# Patient Record
Sex: FEMALE | Race: BLACK OR AFRICAN AMERICAN | NOT HISPANIC OR LATINO | Employment: FULL TIME | URBAN - METROPOLITAN AREA
[De-identification: names, ages, dates, MRNs, and addresses within clinical notes are randomized per-mention and may not be internally consistent; named-entity substitution may affect disease eponyms.]

---

## 2017-02-07 ENCOUNTER — OFFICE VISIT - HEALTHEAST (OUTPATIENT)
Dept: FAMILY MEDICINE | Facility: CLINIC | Age: 27
End: 2017-02-07

## 2017-02-07 DIAGNOSIS — K21.9 GERD (GASTROESOPHAGEAL REFLUX DISEASE): ICD-10-CM

## 2017-02-07 DIAGNOSIS — K59.00 CONSTIPATION: ICD-10-CM

## 2017-02-07 DIAGNOSIS — R53.83 FATIGUE: ICD-10-CM

## 2017-02-07 DIAGNOSIS — Z30.46 NEXPLANON REMOVAL: ICD-10-CM

## 2017-02-07 ASSESSMENT — MIFFLIN-ST. JEOR: SCORE: 1262.66

## 2017-02-10 ENCOUNTER — COMMUNICATION - HEALTHEAST (OUTPATIENT)
Dept: FAMILY MEDICINE | Facility: CLINIC | Age: 27
End: 2017-02-10

## 2017-02-10 DIAGNOSIS — A04.8 HELICOBACTER PYLORI (H. PYLORI): ICD-10-CM

## 2017-03-21 ENCOUNTER — OFFICE VISIT - HEALTHEAST (OUTPATIENT)
Dept: FAMILY MEDICINE | Facility: CLINIC | Age: 27
End: 2017-03-21

## 2017-03-21 ENCOUNTER — RECORDS - HEALTHEAST (OUTPATIENT)
Dept: GENERAL RADIOLOGY | Facility: CLINIC | Age: 27
End: 2017-03-21

## 2017-03-21 ENCOUNTER — COMMUNICATION - HEALTHEAST (OUTPATIENT)
Dept: FAMILY MEDICINE | Facility: CLINIC | Age: 27
End: 2017-03-21

## 2017-03-21 DIAGNOSIS — R10.31 RLQ ABDOMINAL PAIN: ICD-10-CM

## 2017-03-21 DIAGNOSIS — R10.31 RIGHT LOWER QUADRANT PAIN: ICD-10-CM

## 2017-03-21 DIAGNOSIS — K59.00 CONSTIPATION: ICD-10-CM

## 2017-06-26 ENCOUNTER — OFFICE VISIT - HEALTHEAST (OUTPATIENT)
Dept: FAMILY MEDICINE | Facility: CLINIC | Age: 27
End: 2017-06-26

## 2017-06-26 DIAGNOSIS — N92.6 IRREGULAR PERIODS: ICD-10-CM

## 2017-06-26 ASSESSMENT — MIFFLIN-ST. JEOR: SCORE: 1268.89

## 2017-07-31 ENCOUNTER — COMMUNICATION - HEALTHEAST (OUTPATIENT)
Dept: FAMILY MEDICINE | Facility: CLINIC | Age: 27
End: 2017-07-31

## 2017-08-10 ENCOUNTER — OFFICE VISIT - HEALTHEAST (OUTPATIENT)
Dept: FAMILY MEDICINE | Facility: CLINIC | Age: 27
End: 2017-08-10

## 2017-08-10 DIAGNOSIS — Z34.90 PREGNANCY: ICD-10-CM

## 2017-08-10 DIAGNOSIS — N91.2 AMENORRHEA: ICD-10-CM

## 2017-08-10 ASSESSMENT — MIFFLIN-ST. JEOR: SCORE: 1262.67

## 2017-08-17 ENCOUNTER — RECORDS - HEALTHEAST (OUTPATIENT)
Dept: ADMINISTRATIVE | Facility: OTHER | Age: 27
End: 2017-08-17

## 2017-09-21 ENCOUNTER — COMMUNICATION - HEALTHEAST (OUTPATIENT)
Dept: FAMILY MEDICINE | Facility: CLINIC | Age: 27
End: 2017-09-21

## 2017-09-29 ENCOUNTER — PRENATAL OFFICE VISIT - HEALTHEAST (OUTPATIENT)
Dept: FAMILY MEDICINE | Facility: CLINIC | Age: 27
End: 2017-09-29

## 2017-09-29 DIAGNOSIS — Z34.90 PREGNANCY: ICD-10-CM

## 2017-09-29 LAB — HIV 1+2 AB+HIV1 P24 AG SERPL QL IA: NEGATIVE

## 2017-09-29 ASSESSMENT — MIFFLIN-ST. JEOR: SCORE: 1259.27

## 2017-09-30 LAB — HBV SURFACE AG SERPL QL IA: NEGATIVE

## 2017-10-02 LAB — SYPHILIS RPR SCREEN - HISTORICAL: NORMAL

## 2017-10-31 ENCOUNTER — OFFICE VISIT - HEALTHEAST (OUTPATIENT)
Dept: FAMILY MEDICINE | Facility: CLINIC | Age: 27
End: 2017-10-31

## 2017-10-31 DIAGNOSIS — J02.9 SORE THROAT: ICD-10-CM

## 2017-10-31 DIAGNOSIS — J06.9 URI (UPPER RESPIRATORY INFECTION): ICD-10-CM

## 2017-11-02 ENCOUNTER — PRENATAL OFFICE VISIT - HEALTHEAST (OUTPATIENT)
Dept: FAMILY MEDICINE | Facility: CLINIC | Age: 27
End: 2017-11-02

## 2017-11-02 DIAGNOSIS — Z34.90 PREGNANCY: ICD-10-CM

## 2017-11-02 DIAGNOSIS — R05.9 COUGH: ICD-10-CM

## 2017-11-02 ASSESSMENT — MIFFLIN-ST. JEOR: SCORE: 1277.41

## 2017-11-22 ENCOUNTER — RECORDS - HEALTHEAST (OUTPATIENT)
Dept: ADMINISTRATIVE | Facility: OTHER | Age: 27
End: 2017-11-22

## 2017-12-28 ENCOUNTER — PRENATAL OFFICE VISIT - HEALTHEAST (OUTPATIENT)
Dept: FAMILY MEDICINE | Facility: CLINIC | Age: 27
End: 2017-12-28

## 2017-12-28 DIAGNOSIS — K59.00 CONSTIPATION: ICD-10-CM

## 2017-12-28 DIAGNOSIS — Z34.90 PREGNANCY: ICD-10-CM

## 2017-12-28 ASSESSMENT — MIFFLIN-ST. JEOR: SCORE: 1304.63

## 2018-03-30 ENCOUNTER — HOSPITAL ENCOUNTER (OUTPATIENT)
Dept: ULTRASOUND IMAGING | Facility: CLINIC | Age: 28
Discharge: HOME | End: 2018-03-30
Attending: REGISTERED NURSE | Admitting: REGISTERED NURSE
Payer: COMMERCIAL

## 2018-03-30 DIAGNOSIS — O26.849 SIGNIFICANT DISCREPANCY BETWEEN UTERINE SIZE AND CLINICAL DATES, ANTEPARTUM: ICD-10-CM

## 2018-03-30 PROCEDURE — 76815 OB US LIMITED FETUS(S): CPT

## 2018-04-09 ENCOUNTER — HOSPITAL ENCOUNTER (OUTPATIENT)
Facility: CLINIC | Age: 28
Discharge: HOME | End: 2018-04-09
Attending: OBSTETRICS & GYNECOLOGY | Admitting: OBSTETRICS & GYNECOLOGY
Payer: COMMERCIAL

## 2018-04-09 VITALS — TEMPERATURE: 98 F | SYSTOLIC BLOOD PRESSURE: 119 MMHG | DIASTOLIC BLOOD PRESSURE: 76 MMHG | RESPIRATION RATE: 16 BRPM

## 2018-04-09 PROBLEM — Z36.89 ENCOUNTER FOR TRIAGE IN PREGNANT PATIENT: Status: ACTIVE | Noted: 2018-04-09

## 2018-04-09 PROCEDURE — 59025 FETAL NON-STRESS TEST: CPT

## 2018-04-09 PROCEDURE — G0463 HOSPITAL OUTPT CLINIC VISIT: HCPCS | Mod: 25

## 2018-04-09 NOTE — IP AVS SNAPSHOT
MRN:[id number]                      After Visit Summary   4/9/2018    Rody Hassan    MRN: [id number]           Thank you!     Thank you for choosing [facility] for your care. Our goal is always to provide you with excellent care. Hearing back from our patients is one way we can continue to improve our services. Please take a few minutes to complete the written survey that you may receive in the mail after you visit. If you would like to speak to someone directly about your visit please contact Patient Relations at [phone]. Thank you!          Patient Information     Date Of Birth          1990        About your hospital stay     You were admitted on:  April 9, 2018 You last received care in the:  [facility]    You were discharged on:  April 9, 2018       Who to Call     For medical emergencies, please call 911.  For non-urgent questions about your medical care, please call your primary care provider or clinic, None          Attending Provider     Provider Specialty    Luci Penaloza MD OB/Gyn       Primary Care Provider Fax #    [facility] [phone]      Further instructions from your care team       Discharge Instruction for Undelivered Patients      You were seen for: Fetal Assessment  We Consulted: Dr Penaloza  You had (Test or Medicine):NST     Diet:   Drink 8 to 12 glasses of liquids (milk, juice, water) every day.     Activity:  Call your doctor or nurse midwife if your baby is moving less than usual.     Call your provider if you notice:  Swelling in your face or increased swelling in your hands or legs.  Headaches that are not relieved by Tylenol (acetaminophen).  Changes in your vision (blurring: seeing spots or stars.)  Nausea (sick to your stomach) and vomiting (throwing up).   Weight gain of 5 pounds or more per week.  Heartburn that doesn't go away.  Signs of bladder infection: pain when you urinate  "(use the toilet), need to go more often and more urgently.  The bag of drummond (rupture of membranes) breaks, or you notice leaking in your underwear.  Bright red blood in your underwear.  Abdominal (lower belly) or stomach pain.  For first baby: Contractions (tightening) less than 5 minutes apart for one hour or more.  Second (plus) baby: Contractions (tightening) less than 10 minutes apart and getting stronger.  *If less than 34 weeks: Contractions (tightenings) more than 6 times in one hour.  Increase or change in vaginal discharge (note the color and amount)      Follow-up:  As scheduled in the clinic          Pending Results     No orders found from 2018 to 4/10/2018.            Admission Information     Date & Time Provider Department Dept. Phone    2018 Luci Penaloza MD [facility] Labor and Delivery [phone]      Your Vitals Were     Blood Pressure Temperature Respirations             119/76 98  F (36.7  C) (Oral) 16         MyChart Information     [employer] lets you send messages to your doctor, view your test results, renew your prescriptions, schedule appointments and more. To sign up, go to [email or web address] . Click on \"Log in\" on the left side of the screen, which will take you to the Welcome page. Then click on \"Sign up Now\" on the right side of the page.     You will be asked to enter the access code listed below, as well as some personal information. Please follow the directions to create your username and password.     Your access code is: [email or web address]  Expires: 2018 10:48 PM     Your access code will  in 90 days. If you need help or a new code, please call your [place] clinic or [phone].        Care EveryWhere ID     This is your Care EveryWhere ID. This could be used by other organizations to access your [place] medical records  [id number]        Equal Access to Services     GRECIA ESCOBEDO AH: aliyah Garcia, " carlos enrique laurajoseeitan beystarr, waxallegra flynnmarva cobianaan ah. [facility] [phone].    ATENCIÓN: Si tracie cisneros, tiene a benedict disposición servicios gratuitos de asistencia lingüística. Llame al [phone].    We comply with applicable federal civil rights laws and [place] laws. We do not discriminate on the basis of race, color, national origin, age, disability, sex, sexual orientation, or gender identity.               Review of your medicines      UNREVIEWED medicines. Ask your doctor about these medicines        Dose / Directions    PREVPAC Misc   Used for:  H. pylori infection   Generic drug:  abomgyqpj-[id number]-[id number]        take it as directed.   Refills:  0       ZANTAC PO        Refills:  0         CONTINUE these medicines which have NOT CHANGED        Dose / Directions    NO ACTIVE MEDICATIONS   Used for:  Esophageal reflux        .   Refills:  0                Protect others around you: Learn how to safely use, store and throw away your medicines at www.disposemymeds.org.             Medication List: This is a list of all your medications and when to take them. Check marks below indicate your daily home schedule. Keep this list as a reference.      Medications           Morning Afternoon Evening Bedtime As Needed    NO ACTIVE MEDICATIONS   .                                PREVPAC Misc   take it as directed.   Generic drug:  [id number]-[id number]-fgdteffxv                                ZANTAC PO

## 2018-04-09 NOTE — IP AVS SNAPSHOT
[facility] Labor and Delivery    [address]    [facility] [address]    Phone:  [phone]    Fax:  [phone]                                       After Visit Summary   4/9/2018    Rody Hassan    MRN: [id number]           After Visit Summary Signature Page     I have received my discharge instructions, and my questions have been answered. I have discussed any challenges I see with this plan with the nurse or doctor.    ..........................................................................................................................................  Patient/Patient Representative Signature      ..........................................................................................................................................  Patient Representative Print Name and Relationship to Patient    ..................................................               ................................................  Date                                            Time    ..........................................................................................................................................  Reviewed by Signature/Title    ...................................................              ..............................................  Date                                                            Time

## 2018-04-10 NOTE — DISCHARGE INSTRUCTIONS
Discharge Instruction for Undelivered Patients      You were seen for: Fetal Assessment  We Consulted: Dr Penaloza  You had (Test or Medicine):NST     Diet:   Drink 8 to 12 glasses of liquids (milk, juice, water) every day.     Activity:  Call your doctor or nurse midwife if your baby is moving less than usual.     Call your provider if you notice:  Swelling in your face or increased swelling in your hands or legs.  Headaches that are not relieved by Tylenol (acetaminophen).  Changes in your vision (blurring: seeing spots or stars.)  Nausea (sick to your stomach) and vomiting (throwing up).   Weight gain of 5 pounds or more per week.  Heartburn that doesn't go away.  Signs of bladder infection: pain when you urinate (use the toilet), need to go more often and more urgently.  The bag of drummond (rupture of membranes) breaks, or you notice leaking in your underwear.  Bright red blood in your underwear.  Abdominal (lower belly) or stomach pain.  For first baby: Contractions (tightening) less than 5 minutes apart for one hour or more.  Second (plus) baby: Contractions (tightening) less than 10 minutes apart and getting stronger.  *If less than 34 weeks: Contractions (tightenings) more than 6 times in one hour.  Increase or change in vaginal discharge (note the color and amount)      Follow-up:  As scheduled in the clinic

## 2018-04-10 NOTE — PLAN OF CARE
Data: Patient presented to [facility]: 2018 10:11 PM.  Reason for maternal/fetal assessment is decreased fetal movement. Patient reports that since yesterday morning she has felt minimal movement from baby.  Patient is a .  Prenatal record reviewed. Pregnancy has been uncomplicated..  Gestational Age 39w5d. VSS. Fetal movement present. Patient denies uterine contractions, leaking of vaginal fluid/rupture of membranes, vaginal bleeding, pelvic pressure, nausea, vomiting, headache, visual disturbances, epigastric or URQ pain, significant edema. Support person is present.   Action: Verbal consent for EFM. Triage assessment completed. Bill of rights reviewed.  Response: Patient verbalized agreement with plan. Will contact Dr Luci Penaloza with update and further orders.

## 2018-04-10 NOTE — PLAN OF CARE
Data: Patient assessed in the Birthplace for decreased fetal movement.  Cervical exam not examined.  Membranes intact.  Contractions palpate mild and pt denies feeling any.  Action:  Presumed adequate fetal oxygenation documented (see flow record). Discharge instructions reviewed.  Patient instructed to report change in fetal movement, vaginal leaking of fluid or bleeding, abdominal pain, or any concerns related to the pregnancy to her nurse/physician.    Response: Orders to discharge home per Luci Penaloza.  Patient verbalized understanding of education and verbalized agreement with plan. Discharged to home at 2253.

## 2018-04-10 NOTE — PROVIDER NOTIFICATION
04/09/18 2245   Provider Notification   Provider Name/Title Dr Penaloza   Method of Notification Phone   Request Evaluate - Remote   Notification Reason Patient Arrived;Status Update     MD updated on pt arrival and reactive NST. Updated that pt is having mild contractions that she is not feeling. Orders to discharge home

## 2018-04-14 ENCOUNTER — HOSPITAL ENCOUNTER (INPATIENT)
Facility: CLINIC | Age: 28
LOS: 1 days | Discharge: HOME | End: 2018-04-15
Attending: OBSTETRICS & GYNECOLOGY | Admitting: OBSTETRICS & GYNECOLOGY
Payer: COMMERCIAL

## 2018-04-14 LAB
ABO + RH BLD: NORMAL
ABO + RH BLD: NORMAL
SPECIMEN EXP DATE BLD: NORMAL

## 2018-04-14 PROCEDURE — 25000125 ZZHC RX 250: Performed by: OBSTETRICS & GYNECOLOGY

## 2018-04-14 PROCEDURE — 25000132 ZZH RX MED GY IP 250 OP 250 PS 637: Performed by: OBSTETRICS & GYNECOLOGY

## 2018-04-14 PROCEDURE — 25000128 H RX IP 250 OP 636: Performed by: OBSTETRICS & GYNECOLOGY

## 2018-04-14 PROCEDURE — 86780 TREPONEMA PALLIDUM: CPT | Performed by: OBSTETRICS & GYNECOLOGY

## 2018-04-14 PROCEDURE — 0KQM0ZZ REPAIR PERINEUM MUSCLE, OPEN APPROACH: ICD-10-PCS | Performed by: OBSTETRICS & GYNECOLOGY

## 2018-04-14 PROCEDURE — 86900 BLOOD TYPING SEROLOGIC ABO: CPT | Performed by: OBSTETRICS & GYNECOLOGY

## 2018-04-14 PROCEDURE — 72200001 ZZH LABOR CARE VAGINAL DELIVERY SINGLE

## 2018-04-14 PROCEDURE — 86901 BLOOD TYPING SEROLOGIC RH(D): CPT | Performed by: OBSTETRICS & GYNECOLOGY

## 2018-04-14 PROCEDURE — 10907ZC DRAINAGE OF AMNIOTIC FLUID, THERAPEUTIC FROM PRODUCTS OF CONCEPTION, VIA NATURAL OR ARTIFICIAL OPENING: ICD-10-PCS | Performed by: OBSTETRICS & GYNECOLOGY

## 2018-04-14 PROCEDURE — 12000029 ZZH R&B OB INTERMEDIATE

## 2018-04-14 RX ORDER — SODIUM CHLORIDE, SODIUM LACTATE, POTASSIUM CHLORIDE, CALCIUM CHLORIDE 600; 310; 30; 20 MG/100ML; MG/100ML; MG/100ML; MG/100ML
INJECTION, SOLUTION INTRAVENOUS CONTINUOUS
Status: DISCONTINUED | OUTPATIENT
Start: 2018-04-14 | End: 2018-04-14

## 2018-04-14 RX ORDER — ACETAMINOPHEN 325 MG/1
650 TABLET ORAL EVERY 4 HOURS PRN
Status: DISCONTINUED | OUTPATIENT
Start: 2018-04-14 | End: 2018-04-14

## 2018-04-14 RX ORDER — AMOXICILLIN 250 MG
2 CAPSULE ORAL 2 TIMES DAILY
Status: DISCONTINUED | OUTPATIENT
Start: 2018-04-14 | End: 2018-04-15 | Stop reason: HOSPADM

## 2018-04-14 RX ORDER — OXYTOCIN/0.9 % SODIUM CHLORIDE 30/500 ML
100 PLASTIC BAG, INJECTION (ML) INTRAVENOUS CONTINUOUS
Status: DISCONTINUED | OUTPATIENT
Start: 2018-04-14 | End: 2018-04-15 | Stop reason: HOSPADM

## 2018-04-14 RX ORDER — OXYTOCIN 10 [USP'U]/ML
10 INJECTION, SOLUTION INTRAMUSCULAR; INTRAVENOUS
Status: DISCONTINUED | OUTPATIENT
Start: 2018-04-14 | End: 2018-04-15 | Stop reason: HOSPADM

## 2018-04-14 RX ORDER — AMOXICILLIN 250 MG
1 CAPSULE ORAL 2 TIMES DAILY
Status: DISCONTINUED | OUTPATIENT
Start: 2018-04-14 | End: 2018-04-15 | Stop reason: HOSPADM

## 2018-04-14 RX ORDER — MISOPROSTOL 200 UG/1
400 TABLET ORAL
Status: DISCONTINUED | OUTPATIENT
Start: 2018-04-14 | End: 2018-04-15 | Stop reason: HOSPADM

## 2018-04-14 RX ORDER — ACETAMINOPHEN 325 MG/1
650 TABLET ORAL EVERY 4 HOURS PRN
Status: DISCONTINUED | OUTPATIENT
Start: 2018-04-14 | End: 2018-04-15 | Stop reason: HOSPADM

## 2018-04-14 RX ORDER — HYDROCORTISONE 2.5 %
CREAM (GRAM) TOPICAL 3 TIMES DAILY PRN
Status: DISCONTINUED | OUTPATIENT
Start: 2018-04-14 | End: 2018-04-15 | Stop reason: HOSPADM

## 2018-04-14 RX ORDER — CARBOPROST TROMETHAMINE 250 UG/ML
250 INJECTION, SOLUTION INTRAMUSCULAR
Status: DISCONTINUED | OUTPATIENT
Start: 2018-04-14 | End: 2018-04-14

## 2018-04-14 RX ORDER — BISACODYL 10 MG
10 SUPPOSITORY, RECTAL RECTAL DAILY PRN
Status: DISCONTINUED | OUTPATIENT
Start: 2018-04-16 | End: 2018-04-15 | Stop reason: HOSPADM

## 2018-04-14 RX ORDER — METHYLERGONOVINE MALEATE 0.2 MG/ML
200 INJECTION INTRAVENOUS
Status: DISCONTINUED | OUTPATIENT
Start: 2018-04-14 | End: 2018-04-14

## 2018-04-14 RX ORDER — ONDANSETRON 2 MG/ML
4 INJECTION INTRAMUSCULAR; INTRAVENOUS EVERY 6 HOURS PRN
Status: DISCONTINUED | OUTPATIENT
Start: 2018-04-14 | End: 2018-04-14

## 2018-04-14 RX ORDER — FENTANYL CITRATE 50 UG/ML
50-100 INJECTION, SOLUTION INTRAMUSCULAR; INTRAVENOUS
Status: DISCONTINUED | OUTPATIENT
Start: 2018-04-14 | End: 2018-04-14

## 2018-04-14 RX ORDER — OXYTOCIN/0.9 % SODIUM CHLORIDE 30/500 ML
100-340 PLASTIC BAG, INJECTION (ML) INTRAVENOUS CONTINUOUS PRN
Status: COMPLETED | OUTPATIENT
Start: 2018-04-14 | End: 2018-04-14

## 2018-04-14 RX ORDER — IBUPROFEN 800 MG/1
800 TABLET, FILM COATED ORAL EVERY 6 HOURS PRN
Status: DISCONTINUED | OUTPATIENT
Start: 2018-04-14 | End: 2018-04-15 | Stop reason: HOSPADM

## 2018-04-14 RX ORDER — OXYTOCIN/0.9 % SODIUM CHLORIDE 30/500 ML
340 PLASTIC BAG, INJECTION (ML) INTRAVENOUS CONTINUOUS PRN
Status: DISCONTINUED | OUTPATIENT
Start: 2018-04-14 | End: 2018-04-15 | Stop reason: HOSPADM

## 2018-04-14 RX ORDER — LANOLIN 100 %
OINTMENT (GRAM) TOPICAL
Status: DISCONTINUED | OUTPATIENT
Start: 2018-04-14 | End: 2018-04-15 | Stop reason: HOSPADM

## 2018-04-14 RX ORDER — OXYCODONE AND ACETAMINOPHEN 5; 325 MG/1; MG/1
1 TABLET ORAL
Status: COMPLETED | OUTPATIENT
Start: 2018-04-14 | End: 2018-04-14

## 2018-04-14 RX ORDER — NALOXONE HYDROCHLORIDE 0.4 MG/ML
.1-.4 INJECTION, SOLUTION INTRAMUSCULAR; INTRAVENOUS; SUBCUTANEOUS
Status: DISCONTINUED | OUTPATIENT
Start: 2018-04-14 | End: 2018-04-14

## 2018-04-14 RX ORDER — NALOXONE HYDROCHLORIDE 0.4 MG/ML
.1-.4 INJECTION, SOLUTION INTRAMUSCULAR; INTRAVENOUS; SUBCUTANEOUS
Status: DISCONTINUED | OUTPATIENT
Start: 2018-04-14 | End: 2018-04-15 | Stop reason: HOSPADM

## 2018-04-14 RX ORDER — OXYCODONE AND ACETAMINOPHEN 5; 325 MG/1; MG/1
1 TABLET ORAL
Status: DISCONTINUED | OUTPATIENT
Start: 2018-04-14 | End: 2018-04-14

## 2018-04-14 RX ORDER — OXYTOCIN 10 [USP'U]/ML
10 INJECTION, SOLUTION INTRAMUSCULAR; INTRAVENOUS
Status: DISCONTINUED | OUTPATIENT
Start: 2018-04-14 | End: 2018-04-14

## 2018-04-14 RX ORDER — IBUPROFEN 800 MG/1
800 TABLET, FILM COATED ORAL
Status: COMPLETED | OUTPATIENT
Start: 2018-04-14 | End: 2018-04-14

## 2018-04-14 RX ADMIN — OXYTOCIN 340 ML/HR: 10 INJECTION, SOLUTION INTRAMUSCULAR; INTRAVENOUS at 04:27

## 2018-04-14 RX ADMIN — IBUPROFEN 800 MG: 800 TABLET, FILM COATED ORAL at 12:15

## 2018-04-14 RX ADMIN — OXYCODONE HYDROCHLORIDE AND ACETAMINOPHEN 1 TABLET: 5; 325 TABLET ORAL at 05:54

## 2018-04-14 RX ADMIN — FENTANYL CITRATE 50 MCG: 50 INJECTION INTRAMUSCULAR; INTRAVENOUS at 04:30

## 2018-04-14 RX ADMIN — IBUPROFEN 800 MG: 800 TABLET, FILM COATED ORAL at 04:44

## 2018-04-14 RX ADMIN — IBUPROFEN 800 MG: 800 TABLET, FILM COATED ORAL at 19:59

## 2018-04-14 RX ADMIN — LIDOCAINE HYDROCHLORIDE 6 ML: 10 INJECTION, SOLUTION INFILTRATION; PERINEURAL at 04:25

## 2018-04-14 RX ADMIN — SENNOSIDES AND DOCUSATE SODIUM 1 TABLET: 8.6; 5 TABLET ORAL at 12:15

## 2018-04-14 NOTE — PLAN OF CARE
Problem: Patient Care Overview  Goal: Plan of Care/Patient Progress Review  Outcome: Improving  Patient is stable, affect is bright, she is working on breastfeeding, needing some assistance with positioning and identifying proper latch. She has many visitors this afternoon, she is gaining strength and independence.

## 2018-04-14 NOTE — L&D DELIVERY NOTE
OB Delivery Summary      HISTORY OF PRESENT ILLNESS:   with  IUP @ 40w3d who presented with regular painful contractions for about 2 hours.  Her prenatal course was uncomplicated.  Prenatal labs were significant for blood type A+, rubella status immune.  Glucose challenge test 102.  Group beta strep culture was negative.  Estimated fetal weight on admission was approximately 7.5lb        FIRST STAGE OF LABOR:  The patient was admitted to Labor and Delivery with a fetal heart rate tracing that was category I. She did have occasional early decelerations. She was 5/70/-1 on admit and progressed quickly in labor. She became completely dilated at 4:14am shortly after AROM of a bulging bag. She began involuntarily pushing immediately from the + 2 station.       SECOND STAGE OF LABOR:  She gradually brought the vertex down in the birth canal and delivered a viable female infant at 4:20am on 18.  After delivery of the head, the anterior shoulder and body delivered without difficulty.  A single nuchal cord was noted and the infant delivered through. The infant was placed on the mother's chest.  Delayed cord clamping was performed.        THIRD STAGE OF LABOR:  The cord was clamped and cut. The placenta then delivered spontaneously and appeared intact with a 3-vessel cord at 4:26am.  Pitocin was started and fundal massage was performed. Tone was initially boggy with moderate flow but firmed with massage.  Perineum was inspected and a small second degree laceration was noted. It was repaired with a running suture of 3-0 Vicryl. Quantitative blood loss for the delivery was 163cc.       Both mother and baby tolerated delivery well and there were no complications. Fetal weight was PENDING and Apgars were 8 and 9 at 1 and 5 minutes, respectively.       Ambreen Stanley  2018  4:41 AM

## 2018-04-14 NOTE — IP AVS SNAPSHOT
MRN:[id number]                      After Visit Summary   4/14/2018    Rody Hassan    MRN: [id number]           Thank you!     Thank you for choosing [facility] for your care. Our goal is always to provide you with excellent care. Hearing back from our patients is one way we can continue to improve our services. Please take a few minutes to complete the written survey that you may receive in the mail after you visit. If you would like to speak to someone directly about your visit please contact Patient Relations at [phone]. Thank you!          Patient Information     Date Of Birth          1990        Designated Caregiver       Most Recent Value    Caregiver    Will someone help with your care after discharge? no      About your hospital stay     You were admitted on:  April 14, 2018 You last received care in the:  [facility] Postpartum    You were discharged on:  April 15, 2018        Reason for your hospital stay       Maternity care                  Who to Call     For medical emergencies, please call 911.  For non-urgent questions about your medical care, please call your primary care provider or clinic, None          Attending Provider     Provider Specialty    Ambreen Stanley MD OB/Gyn       Primary Care Provider Fax #    [facility] [phone]      After Care Instructions     Activity       Review discharge instructions            Diet       Resume previous diet            Discharge Instructions - Postpartum visit       Schedule postpartum visit with your provider and return to clinic in 6 weeks.                  Further instructions from your care team       Postpartum Vaginal Delivery Instructions    Activity       Ask family and friends for help when you need it.    Do not place anything in your vagina for 6 weeks.    You are not restricted on other activities, but take it easy for a few weeks to allow your body to recover from  delivery.  You are able to do any activities you feel up to that point.    No driving until you have stopped taking your pain medications (usually two weeks after delivery).     Call your health care provider if you have any of these symptoms:       Increased pain, swelling, redness, or fluid around your stiches from an episiotomy or perineal tear.    A fever above 100.4 F (38 C) with or without chills when placing a thermometer under your tongue.    You soak a sanitary pad with blood within 1 hour, or you see blood clots larger than a golf ball.    Bleeding that lasts more than 6 weeks.    Vaginal discharge that smells bad.    Severe pain, cramping or tenderness in your lower belly area.    A need to urinate more frequently (use the toilet more often), more urgently (use the toilet very quickly), or it burns when you urinate.    Nausea and vomiting.    Redness, swelling or pain around a vein in your leg.    Problems breastfeeding or a red or painful area on your breast.    Chest pain and cough or are gasping for air.    Problems coping with sadness, anxiety, or depression.  If you have any concerns about hurting yourself or the baby, call your provider immediately.     You have questions or concerns after you return home.     Keep your hands clean:  Always wash your hands before touching your perineal area and stitches.  This helps reduce your risk of infection.  If your hands aren't dirty, you may use an alcohol hand-rub to clean your hands. Keep your nails clean and short.        Pending Results     No orders found for last 3 day(s).            Statement of Approval     Ordered          04/15/18 1200  I have reviewed and agree with all the recommendations and orders detailed in this document.  EFFECTIVE NOW     Approved and electronically signed by:  Ambreen Stanley MD             Admission Information     Date & Time Provider Department Dept. Phone    4/14/2018 Ambreen Stanley MD [facility]  "Postpartum [phone]      Your Vitals Were     Blood Pressure Pulse Temperature Respirations Height Weight    101/51 83 98.2  F (36.8  C) (Oral) 16 1.6 m (5' 3\") 68.9 kg (152 lb)    BMI (Body Mass Index)                   26.93 kg/m2           [employer] Information     [employer] lets you send messages to your doctor, view your test results, renew your prescriptions, schedule appointments and more. To sign up, go to [email or web address] . Click on \"Log in\" on the left side of the screen, which will take you to the Welcome page. Then click on \"Sign up Now\" on the right side of the page.     You will be asked to enter the access code listed below, as well as some personal information. Please follow the directions to create your username and password.     Your access code is: [email or web address]  Expires: 2018 10:48 PM     Your access code will  in 90 days. If you need help or a new code, please call your [place] clinic or [phone].        Care EveryWhere ID     This is your Care EveryWhere ID. This could be used by other organizations to access your [place] medical records  [id number]        Equal Access to Services     GRECIA ESCOBEDO : Brandi Mejia, waaxda luqadaha, qaybta kaalmada adekhadijahyada, lora gaines. So [facility] [phone].    ATENCIÓN: Si habla español, tiene a benedict disposición servicios gratuitos de asistencia lingüística. Llame al [phone].    We comply with applicable federal civil rights laws and [place] laws. We do not discriminate on the basis of race, color, national origin, age, disability, sex, sexual orientation, or gender identity.               Review of your medicines      START taking        Dose / Directions    acetaminophen 325 MG tablet   Commonly known as:  TYLENOL        Dose:  650 mg   Take 2 tablets (650 mg) by mouth every 4 hours as needed for mild pain or fever (greater than or equal to 38? C /100.4? F (oral) or 38.5? C/ 101.4? F " (core).)   Quantity:  100 tablet   Refills:  0       ibuprofen 800 MG tablet   Commonly known as:  ADVIL/MOTRIN        Dose:  800 mg   Take 1 tablet (800 mg) by mouth every 6 hours as needed for other (cramping)   Quantity:  90 tablet   Refills:  0       senna-docusate 8.6-50 MG per tablet   Commonly known as:  SENOKOT-S;PERICOLACE        Dose:  1 tablet   Take 1 tablet by mouth 2 times daily as needed for constipation   Quantity:  100 tablet   Refills:  0         CONTINUE these medicines which have NOT CHANGED        Dose / Directions    ZANTAC PO        Refills:  0            Where to get your medicines      Some of these will need a paper prescription and others can be bought over the counter. Ask your nurse if you have questions.     You don't need a prescription for these medications     acetaminophen 325 MG tablet    ibuprofen 800 MG tablet    senna-docusate 8.6-50 MG per tablet                Protect others around you: Learn how to safely use, store and throw away your medicines at www.disposemymeds.org.             Medication List: This is a list of all your medications and when to take them. Check marks below indicate your daily home schedule. Keep this list as a reference.      Medications           Morning Afternoon Evening Bedtime As Needed    acetaminophen 325 MG tablet   Commonly known as:  TYLENOL   Take 2 tablets (650 mg) by mouth every 4 hours as needed for mild pain or fever (greater than or equal to 38? C /100.4? F (oral) or 38.5? C/ 101.4? F (core).)                                ibuprofen 800 MG tablet   Commonly known as:  ADVIL/MOTRIN   Take 1 tablet (800 mg) by mouth every 6 hours as needed for other (cramping)   Last time this was given:  800 mg on 4/15/2018  8:11 AM                                senna-docusate 8.6-50 MG per tablet   Commonly known as:  SENOKOT-S;PERICOLACE   Take 1 tablet by mouth 2 times daily as needed for constipation   Last time this was given:  1 tablet on 4/15/2018   8:11 AM                                ZANTAC PO

## 2018-04-14 NOTE — H&P
OB Brief Admit H&P    No significant change in general health status based on examination of the patient, review of Nursing Admission Database and prenatal record.    Pt is a 28 year old  @ 40w3d who presented to L&D with regular painful contractions.    Patient's prenatal course has been complicated only by transfer of care at 27 weeks    Prenatal Labs:    Blood type A+  Rubella immune    GBS neg    EFW: 7.5lb    There were no vitals taken for this visit.  EFM:  Baseline 155, moderate variability, + accels, variable decels with some contractions  [place]: every 3-4min    SVE: 5/70%/-1  Membranes:  Intact    Assessment:  28 year old  @ 40w3d admitted for spontaneous labor    Plan:  1. Admit to labor and delivery   2. Patient would like to avoid augmentation and epidural. Expectant mgmt. Anticipate .   3. Declined TDAP this pregnancy    Ambreen Stanley  2018  1:01 AM

## 2018-04-14 NOTE — IP AVS SNAPSHOT
[facility]    [address]    [facility] [address]    Phone:  [phone]    Fax:  [phone]                                       After Visit Summary   4/14/2018    Rody Hassan    MRN: [id number]           After Visit Summary Signature Page     I have received my discharge instructions, and my questions have been answered. I have discussed any challenges I see with this plan with the nurse or doctor.    ..........................................................................................................................................  Patient/Patient Representative Signature      ..........................................................................................................................................  Patient Representative Print Name and Relationship to Patient    ..................................................               ................................................  Date                                            Time    ..........................................................................................................................................  Reviewed by Signature/Title    ...................................................              ..............................................  Date                                                            Time

## 2018-04-14 NOTE — PLAN OF CARE
Problem: Labor (Cervical Ripen, Induct, Augment) (Adult,Obstetrics,Pediatric)  Goal: Signs and Symptoms of Listed Potential Problems Will be Absent, Minimized or Managed (Labor)  Signs and symptoms of listed potential problems will be absent, minimized or managed by discharge/transition of care (reference Labor (Cervical Ripen, Induct, Augment) (Adult,Obstetrics,Pediatric) CPG).   @ 40 3/7 weeks to floor @ 0045 and admitted in spontaneous labor. GBS negative, blood type A positive. Healthy pregnancy. Dr Stanley at bedside @ 0115 to review fetal monitor strip and evaluate. OK for intermittent monitoring received. Progressed to complete @ 0414 and delivery of viable female @ 0420. Nitrous oxide used in labor for pain management.

## 2018-04-14 NOTE — PLAN OF CARE
Problem: Patient Care Overview  Goal: Plan of Care/Patient Progress Review  Data: Rody Hassan transferred to [facility] via wheelchair at 0710. Baby transferred via parent's arms.  Action: Receiving unit notified of transfer: Yes. Patient and family notified of room change. Report given to Jodi at 0735. Belongings sent to receiving unit. Accompanied by Registered Nurse. Oriented patient to surroundings. Call light within reach. ID bands double-checked with receiving RN.  Response: Patient tolerated transfer and is stable.

## 2018-04-14 NOTE — PROVIDER NOTIFICATION
04/14/18 0407   Cervical Exam   Dilation 9.5  (per Dr. Stanley)   Effacement (%) 100   Station 0   Membranes   Membrane Status Ruptured    Rupture Type AROM   Rupture Date 04/14/18   Rupture Time 0407   Amniotic Fluid Color Clear   Amniotic Fluid Odor Normal   Amniotic Fluid Amount Small   Vaginal Bleeding   Vaginal Bleeding present   Characteristics (Vaginal Bleeding) bloody show   Vaginal Drainage   Mucous No   Provider Notification   Provider Name/Title Dr. Stanley   Method of Notification At Bedside   Request Attend Delivery   Notification Reason SVE;Membrane Status     
no

## 2018-04-15 VITALS
HEIGHT: 63 IN | DIASTOLIC BLOOD PRESSURE: 51 MMHG | SYSTOLIC BLOOD PRESSURE: 101 MMHG | BODY MASS INDEX: 26.93 KG/M2 | TEMPERATURE: 98.2 F | HEART RATE: 83 BPM | RESPIRATION RATE: 16 BRPM | WEIGHT: 152 LBS

## 2018-04-15 LAB
HGB BLD-MCNC: 8.9 G/DL (ref 11.7–15.7)
T PALLIDUM IGG+IGM SER QL: NEGATIVE

## 2018-04-15 PROCEDURE — 36415 COLL VENOUS BLD VENIPUNCTURE: CPT | Performed by: OBSTETRICS & GYNECOLOGY

## 2018-04-15 PROCEDURE — 25000132 ZZH RX MED GY IP 250 OP 250 PS 637: Performed by: OBSTETRICS & GYNECOLOGY

## 2018-04-15 PROCEDURE — 85018 HEMOGLOBIN: CPT | Performed by: OBSTETRICS & GYNECOLOGY

## 2018-04-15 RX ORDER — AMOXICILLIN 250 MG
1 CAPSULE ORAL 2 TIMES DAILY PRN
Qty: 100 TABLET | COMMUNITY
Start: 2018-04-15

## 2018-04-15 RX ORDER — IBUPROFEN 800 MG/1
800 TABLET, FILM COATED ORAL EVERY 6 HOURS PRN
Qty: 90 TABLET | COMMUNITY
Start: 2018-04-15

## 2018-04-15 RX ORDER — ACETAMINOPHEN 325 MG/1
650 TABLET ORAL EVERY 4 HOURS PRN
Qty: 100 TABLET | COMMUNITY
Start: 2018-04-15

## 2018-04-15 RX ADMIN — SENNOSIDES AND DOCUSATE SODIUM 1 TABLET: 8.6; 5 TABLET ORAL at 08:11

## 2018-04-15 RX ADMIN — IBUPROFEN 800 MG: 800 TABLET, FILM COATED ORAL at 02:07

## 2018-04-15 RX ADMIN — IBUPROFEN 800 MG: 800 TABLET, FILM COATED ORAL at 08:11

## 2018-04-15 RX ADMIN — IBUPROFEN 800 MG: 800 TABLET, FILM COATED ORAL at 17:39

## 2018-04-15 NOTE — PROGRESS NOTES
"OB Post-partum Note  PPD# 1    S:  Patient doing well.  Pain controlled.  Voiding.  Bleeding is normal.  Breast feeding but  Baby not latching well.     O:  /51  Pulse 83  Temp 98.2  F (36.8  C) (Oral)  Resp 16  Ht 1.6 m (5' 3\")  Wt 68.9 kg (152 lb)  Breastfeeding? Unknown  BMI 26.93 kg/m2  Gen- A&O, NAD  Abd- Non-tender, fundus firm at umbilicus  Ext- non-tender, no edema, no leg or calf pain    Hemoglobin   Date Value Ref Range Status   04/15/2018 8.9 (L) 11.7 - 15.7 g/dL Final     A +  Rubella Immune    A/P: 28 year old  PPD# 1 s/p     1.  Routine post-partum cares  2.  Analgesia  3.  Discharge home today at pt request. No Rx given  4.  The plan of care was discussed with the patient.  She expressed understanding and agreement  5.  RTC in 6 weeks  6.  Indications to call or return were discussed. Post partum instructions were given      Ambreen Stanley  4/15/2018  11:14 AM  "

## 2018-04-15 NOTE — PLAN OF CARE
Problem: Patient Care Overview  Goal: Plan of Care/Patient Progress Review  Outcome: Improving  VSS. Breastfeeding  with staff assistance on positioning and awakening , utilizing nipple shield and nipple everter intermittently for some success. Tolerating a regular diet, voiding without difficulty and ambulating in room ad dee. Ibuprofen provided for uterine cramping pain for stated relief. Bonding well with , spouse present and assistive with cares.

## 2018-04-15 NOTE — PLAN OF CARE
Problem: Patient Care Overview  Goal: Plan of Care/Patient Progress Review  Outcome: Adequate for Discharge Date Met: 04/15/18  Pt took an ibuprofen for cramping, scored 8 on PPD, copy is given, explained to do a self check test in 1-2 weeks, informed about follow up with ob dr in 6 weeks, all discharge education is completed, pt does not have further questions, has her own pump at home, planning on supplementing her infant with formula at home and pump, some formula is provided for home use, since infant is not latching at the breast well, discharging to home with infant this afternoon in stable condition.

## 2018-04-15 NOTE — PLAN OF CARE
Problem: Patient Care Overview  Goal: Plan of Care/Patient Progress Review  Outcome: Improving  Patient is stable, actively working on breastfeeding, and wants discharge later today. Breastfeeding education, and assistance provided.

## 2018-04-15 NOTE — PLAN OF CARE
Problem: Patient Care Overview  Goal: Plan of Care/Patient Progress Review  Outcome: No Change  Pt's cramping pain is managed with ibuprofen, took a shower, breast feeding with encouragement, shield is introduced at start of shift, pt did not like as much, brought a nipple everter, having a small amount of colostrum; informed pt about first 24 hr expectations, encouraged attempting to breast feed every 2-3 hrs, answered questions.

## 2018-04-18 ENCOUNTER — COMMUNICATION - HEALTHEAST (OUTPATIENT)
Dept: FAMILY MEDICINE | Facility: CLINIC | Age: 28
End: 2018-04-18

## 2021-05-30 VITALS — BODY MASS INDEX: 22.54 KG/M2 | WEIGHT: 129.25 LBS

## 2021-05-30 VITALS — WEIGHT: 128 LBS | BODY MASS INDEX: 21.85 KG/M2 | HEIGHT: 64 IN

## 2021-05-31 VITALS — WEIGHT: 129.75 LBS | BODY MASS INDEX: 22.99 KG/M2 | HEIGHT: 63 IN

## 2021-05-31 VITALS — WEIGHT: 139 LBS | HEIGHT: 63 IN | BODY MASS INDEX: 24.63 KG/M2

## 2021-05-31 VITALS — BODY MASS INDEX: 22.86 KG/M2 | HEIGHT: 63 IN | WEIGHT: 129 LBS

## 2021-05-31 VITALS — WEIGHT: 131 LBS | BODY MASS INDEX: 23.21 KG/M2

## 2021-05-31 VITALS — WEIGHT: 131.12 LBS | BODY MASS INDEX: 23.23 KG/M2 | HEIGHT: 63 IN

## 2021-05-31 VITALS — HEIGHT: 63 IN | WEIGHT: 133 LBS | BODY MASS INDEX: 23.57 KG/M2

## 2021-06-08 NOTE — PROGRESS NOTES
"S:  25 yo female here to have her nexplanon removed.  She wonders if it is causing her o have anxiety, mood changes.  She thought this was from having a baby, but now wonders if it is from her nexplanon.  It seems to be cyclic, it gets better, then gets worse.  She does desire another pregnancy.    She is very tired.  She is constipated.  She pumped for a month after her baby was born.  She is getting good support at home.  She works nights, and only gets abouat 3-4 hours of sleep during the day.  She just wants to sleep all day.  No weight changes.  No dry skin.    She has not had any periods at all since putting in her nexplanon.     Soc hx:  She decreased her hours to 0.6.  She is not exercising.  She is eating helathy.  She drinks a lot of caffeine.     fam hx: no thyroid problems.      O:    Visit Vitals     BP 90/54     Pulse 82     Temp 98.1  F (36.7  C) (Oral)     Resp 14     Ht 5' 3.5\" (1.613 m)     Wt 128 lb (58.1 kg)     SpO2 99%     BMI 22.32 kg/m2     Gen:  Nad, alert  Rrr, no m/r/g  cta bilaterally, no wheezes or rhonchi noted  abd soft.  Non tender  No thyromegaly or nodules noted.   No tremor noted  Normal dtr bilaterally.   No rashes noted.    Normal mood.  Appropriate affect.  Fluent speech.      Patient Active Problem List   Diagnosis     Alopecia     Vitamin D Deficiency     Heartburn     Supervision of normal first pregnancy     Spontaneous vaginal delivery     Second degree perineal laceration      difficulty in feeding at breast     Breastfeeding problem     Current Outpatient Prescriptions on File Prior to Visit   Medication Sig Dispense Refill     etonogestrel (NEXPLANON) 68 mg Impl implant Exp 2018  Lot [id number]  0     FENUGREEK SEED EXTRACT ORAL Take 1 capsule by mouth 2 (two) times a day.       No current facility-administered medications on file prior to visit.           No results found for this or any previous visit (from the past 48 hour(s)).      Assessment/Plan:  1. " Constipation  Increase fiber and water.      2. Nexplanon removal  nexplanon removal    Pre-operative Diagnosis: desires fertility    Post-operative Diagnosis: s/p nexplanon removal    Indications: desires fertility    Procedure Details   The risks (including infection, bleeding, pain) and benefits of the procedure were explained to the patient and verbal informed consent was obtained.    Pt is right handed, so the left arm was prepped.  The area was numbed with lidocaine, and the nexplanon was removed in the usual fashion. steristrips were placed to ensure good wound approximation.   Patient tolerated procedure well.  No complications.  No ebl.        Condition:  Stable    Complications:  None    Plan:    The patient was advised to call for any problems. She was advised to use OTC analgesics as needed for mild to moderate pain.   Advised to use pnv, and to cut back on caffeine intake  Advised to use ranitidine or tums for gerd instead of omeprazole        3. Fatigue  Rule out metabolic causes.   - Thyroid Cascade  - HM1(CBC and Differential)  - HM1 (CBC with Diff)    4. GERD (gastroesophageal reflux disease)    - ranitidine (ZANTAC) 150 MG tablet; Take 1 tablet (150 mg total) by mouth 2 (two) times a day.  Dispense: 60 tablet; Refill: 1  - H. pylori Antibody, IgG          Kelly Xiao   2/7/2017 2:09 PM

## 2021-06-09 NOTE — PROGRESS NOTES
S:  26 yo female who is here with complaints of rlq pain.  She normally has constipation, and this has not changed.  She feels a pressure in her right side like she has to have a bm.  Her last period was on 17.  She has not had one since that time.  She did a pregnancy test a day ago, and it was negative.  She also did an ovulation test that was negative.  She has not had a normal period.    She just finished meds for h pylori.  She feels quite bloated.  She is eating well.    No dysuria.  It is uncomfortable for her to sit.  She feels some pressure in her right area.    No n/v.    No birth control at this time.  nexplanon was removed.      O:    Visit Vitals     /56 (Patient Site: Left Arm, Patient Position: Sitting, Cuff Size: Adult Regular)     Pulse 68     Temp 99.1  F (37.3  C)     Resp 20     Wt 129 lb 4 oz (58.6 kg)  Comment: w/flat sandlas     BMI 22.54 kg/m2     Gen: no acute distress  Neck:  Supple, no lad, no thyromegaly or nodules noted.    Heart:  Regular rate and rhythm.  No m/r/g  Lungs: cta bilaterally, no wheezes or rhonchi.  Good air inspiration  Abdomen:  No masses or organomegaly.  Tender in rlq.  No rebound or guarding.  rovsing sign is negative.  Abdomen is non distended.  No hernia is noted.  No ingunal hernia noted.    No cva tenderness noted.    No rashes noted.          Patient Active Problem List   Diagnosis     Alopecia     Vitamin D Deficiency     Heartburn     Supervision of normal first pregnancy     Spontaneous vaginal delivery     Second degree perineal laceration      difficulty in feeding at breast     Breastfeeding problem     Helicobacter pylori (H. pylori)     Current Outpatient Prescriptions on File Prior to Visit   Medication Sig Dispense Refill     omeprazole (PRILOSEC) 20 MG capsule Take 1 capsule (20 mg total) by mouth 2 (two) times a day. 28 capsule 0     FENUGREEK SEED EXTRACT ORAL Take 1 capsule by mouth 2 (two) times a day.       ranitidine (ZANTAC)  150 MG tablet Take 1 tablet (150 mg total) by mouth 2 (two) times a day. 60 tablet 1     No current facility-administered medications on file prior to visit.           Recent Results (from the past 48 hour(s))   HM1 (CBC with Diff)    Collection Time: 03/21/17  4:46 PM   Result Value Ref Range    WBC 8.4 4.0 - 11.0 thou/uL    RBC 4.77 3.80 - 5.40 mill/uL    Hemoglobin 12.7 12.0 - 16.0 g/dL    Hematocrit 39.2 35.0 - 47.0 %    MCV 82 80 - 100 fL    MCH 26.6 (L) 27.0 - 34.0 pg    MCHC 32.4 32.0 - 36.0 g/dL    RDW 12.7 11.0 - 14.5 %    Platelets 322 140 - 440 thou/uL    MPV 7.9 7.0 - 10.0 fL    Neutrophils % 60 50 - 70 %    Lymphocytes % 30 20 - 40 %    Monocytes % 9 2 - 10 %    Eosinophils % 1 0 - 6 %    Basophils % 1 0 - 2 %    Neutrophils Absolute 5.0 2.0 - 7.7 thou/uL    Lymphocytes Absolute 2.5 0.8 - 4.4 thou/uL    Monocytes Absolute 0.8 0.0 - 0.9 thou/uL    Eosinophils Absolute 0.1 0.0 - 0.4 thou/uL    Basophils Absolute 0.1 0.0 - 0.2 thou/uL   Pregnancy (Beta-hCG, Qual), Urine    Collection Time: 03/21/17  4:52 PM   Result Value Ref Range    Pregnancy Test, Urine Negative Negative    Specific Gravity, UA 1.020 1.001 - 1.030   Urinalysis-UC if Indicated    Collection Time: 03/21/17  4:52 PM   Result Value Ref Range    Color, UA Yellow Colorless, Yellow, Straw, Light Yellow    Clarity, UA Clear Clear    Glucose, UA Negative Negative    Bilirubin, UA Negative Negative    Ketones, UA Negative Negative    Specific Gravity, UA 1.020 1.005 - 1.030    Blood, UA Negative Negative    pH, UA 7.0 5.0 - 8.0    Protein, UA Negative Negative mg/dL    Urobilinogen, UA 0.2 E.U./dL 0.2 E.U./dL, 1.0 E.U./dL    Nitrite, UA Negative Negative    Leukocytes, UA Trace (!) Negative    Bacteria, UA Few (!) None Seen hpf    RBC, UA 0-2 None Seen, 0-2 hpf    WBC, UA 0-5 None Seen, 0-5 hpf    Squam Epithel, UA 0-5 None Seen, 0-5 lpf     abd xray personally reviewed by myself does reveal a large amount of stool through the entire colon.  No  dilated loops of dilated bowel are noted.      Assessment/Plan:  1. RLQ abdominal pain  I suspect this is due to constipation.  We discussed increasing fiber in the diet.  Increasing water intake.  Increasing exercise.  If her pain continues in spite of this we will obtain a pelvic ultrasound.  - Pregnancy (Beta-hCG, Qual), Urine  - Urinalysis-UC if Indicated  - HM1(CBC and Differential)  - HM1 (CBC with Diff)  - XR Abdomen Flat and Upright; Future  - Culture, Urine    2. Constipation  Increase fiber  Increase water intake.    - polyethylene glycol (GLYCOLAX) 17 gram/dose powder; Take 17 g by mouth daily.  Dispense: 255 g; Refill: 2          Kelly Xiao   3/21/2017 4:36 PM

## 2021-06-11 NOTE — PROGRESS NOTES
"  Chief Complaint   Patient presents with     Menstrual Problem     Irregular periods. No cramping, no spotting in between. After stopping BC. Trying for another baby.         HPI:   Rody Hassan is a 27 y.o. female c/o irregular periods.  Periods 3/24/17 then  5/11/17.  She had nexplanon removed 2/2017.  She would like to get pregnant.  Home pregnancy test today was negative.  No problems with getting pregnant after stopping ocP two years ago.  Had normal periods prior to going on pill    ROS:  A 12 point comprehensive review of systems was negative except as noted.     Medications:  Current Outpatient Prescriptions on File Prior to Visit   Medication Sig Dispense Refill     omeprazole (PRILOSEC) 20 MG capsule Take 1 capsule (20 mg total) by mouth 2 (two) times a day. 28 capsule 0     FENUGREEK SEED EXTRACT ORAL Take 1 capsule by mouth 2 (two) times a day.       polyethylene glycol (GLYCOLAX) 17 gram/dose powder Take 17 g by mouth daily. 255 g 2     ranitidine (ZANTAC) 150 MG tablet Take 1 tablet (150 mg total) by mouth 2 (two) times a day. 60 tablet 1     No current facility-administered medications on file prior to visit.          Social History:  Social History   Substance Use Topics     Smoking status: Never Smoker     Smokeless tobacco: Not on file     Alcohol use No         Physical Exam:   Vitals:    06/26/17 1541   BP: 104/58   Patient Site: Left Arm   Patient Position: Sitting   Cuff Size: Adult Regular   Pulse: 80   Temp: 98.6  F (37  C)   TempSrc: Oral   SpO2: 99%   Weight: 131 lb 1.9 oz (59.5 kg)   Height: 5' 3\" (1.6 m)       GEN:  NAD        Assessment/Plan:    1. Irregular periods        Irregular periods due to removal of nexplanon.  Discussed it can take 6-9 months for normal cycles to resume.  Nothing to do at this time.  Discussed normal menstrual cycle and fertile times.    She doesn't smoke or drink.  Recommended starting prenatal vitamin now.  No medications other than the omeprazole and " tylenol while attempting pregnancy.      The following portions of the patient's history were reviewed and updated as appropriate: allergies, current medications, past family history, past medical history, past social history, past surgical history and problem list.    Mariangel Aguila MD      6/26/2017      15 minute visit.  All spent in counseling.

## 2021-06-12 NOTE — PROGRESS NOTES
"S:  Chief Complaint   Patient presents with     Pregnancy confirmation     Tested positive at home about 3 weeks ago.      Had a normal lmp, but they were every 2 months and were quite irregular.  Her cycles were often 52 days or longer.    She is quite tired.  No breast tenderness.  No bleeding.    She has changed her diet a lot.  She has cut out a lot of sugar, and eats a diet   No exposures during this pregnancy. No fevers or rashes.    She is taking a prenatal vitamin  No etoh or tobacco.    She works [employer] right now, and is mostly working nights.  She is quite happy about this baby.    She is sad that she will not be able to deliver at [facility].         fam hx:  RA in sister     O:  BP 90/58  Pulse 92  Temp 98.1  F (36.7  C) (Oral)   Resp 16  Ht 5' 3\" (1.6 m)  Wt 129 lb 12 oz (58.9 kg)  LMP 07/01/2017 (Exact Date)  Breastfeeding? No  BMI 22.98 kg/m2  Gen:  Nad, alert  No thyromegaly or nodules.    Rrr, no m/r/g  Reflexes are normal  No edema.      Patient Active Problem List   Diagnosis     Alopecia     Vitamin D Deficiency     Heartburn     Helicobacter pylori (H. pylori)     Current Outpatient Prescriptions on File Prior to Visit   Medication Sig Dispense Refill     FENUGREEK SEED EXTRACT ORAL Take 1 capsule by mouth 2 (two) times a day.       omeprazole (PRILOSEC) 20 MG capsule Take 1 capsule (20 mg total) by mouth 2 (two) times a day. 28 capsule 0     polyethylene glycol (GLYCOLAX) 17 gram/dose powder Take 17 g by mouth daily. 255 g 2     No current facility-administered medications on file prior to visit.           Recent Results (from the past 48 hour(s))   Pregnancy, Urine    Collection Time: 08/10/17 11:03 AM   Result Value Ref Range    Pregnancy Test, Urine Positive (!) Negative         Assessment/Plan:  1. Amenorrhea    - Pregnancy, Urine  - US OB < 14 Weeks With Transvaginal; Future    2. Pregnancy  Continue with pnv  I reviewed medication use in pregnancy.  I encouraged her to continue with a " healthy diet that is high in vegetables and proteins.  She drinks very little caffeine already.  She does drink a lot of water and I encouraged her to continue with this.  We reviewed the signs and symptoms of miscarriage.  I encouraged her to follow-up if she develops any of those.  We will obtain an ultrasound for dates given her very irregular cycles.  We will follow-up in 5-6 weeks for first OB visit.  Sooner if she has any worsening problems.    - US OB < 14 Weeks With Transvaginal; Future          Kelly Xiao   8/10/2017 11:08 AM

## 2021-06-13 NOTE — PROGRESS NOTES
PRENATAL VISIT   FIRST OBSTETRICAL EXAM - OB    Assessment / Impression       Normal first prenatal visit at 12w2d  Discussed orientation, general information, lifestyle, nutrition, exercise,warning signs, resources, lab testing, risk screening and discussed cystic fibrosis screening with patient.  Questions answered.    Plan:     Encouraged high protein diet     Initial labs drawn.  Prenatal vitamins.  Problem list reviewed and updated.  Genetic screening test options discussed:  Patient elects to decline all testing  Role of ultrasound in pregnancy discussed; fetal survey: requested.  Follow up: Return in 4 weeks (on 10/27/2017).      Subjective:    Rody Hassan is a 27 y.o.  here today for her First Obstetrical Exam.   OB History    Para Term  AB Living   2 1 1   1   SAB TAB Ectopic Multiple Live Births       1      # Outcome Date GA Lbr Johnnie/2nd Weight Sex Delivery Anes PTL Lv   2 Current            1 Term 10/10/15 40w6d / 01:23 6 lb 7 oz (2.92 kg) M Vag-Spont EPI N TASHIA          Expected Date of Delivery: 2018, by Ultrasound    Past Medical History:   Diagnosis Date     Acid reflux      Second degree perineal laceration 10/10/2015     Past Surgical History:   Procedure Laterality Date     NO PAST SURGERIES       Social History   Substance Use Topics     Smoking status: Never Smoker     Smokeless tobacco: Never Used     Alcohol use No     Current Outpatient Prescriptions   Medication Sig Dispense Refill     FENUGREEK SEED EXTRACT ORAL Take 1 capsule by mouth 2 (two) times a day.       omeprazole (PRILOSEC) 20 MG capsule Take 1 capsule (20 mg total) by mouth 2 (two) times a day. 28 capsule 0     polyethylene glycol (GLYCOLAX) 17 gram/dose powder Take 17 g by mouth daily. 255 g 2     ranitidine (ZANTAC) 150 MG tablet Take 1 tablet (150 mg total) by mouth 2 (two) times a day. 60 tablet 1     No current facility-administered medications for this visit.      No Known Allergies          High Risk  "Behavior: None    Review of Systems  General:  Denies problem  Eyes: Denies problem  Ears/Nose/Throat: Denies problem  Cardiovascular: Denies problem  Respiratory:  Denies problem  Gastrointestinal:  Denies problem, Genitourinary: Denies problem  Musculoskeletal:  Denies problem  Skin: Denies problem  Neurologic: Denies problem  Psychiatric: Denies problem  Endocrine: Denies problem  Heme/Lymphatic: Denies problem   Allergic/Immunologic: Denies problem       Objective:   Objective    Vitals:    09/29/17 1040   BP: 90/60   Pulse: 94   Resp: 24   Temp: 97.8  F (36.6  C)   TempSrc: Oral   SpO2: 99%   Weight: 129 lb (58.5 kg)   Height: 5' 3\" (1.6 m)     Physical Exam:  General Appearance: Alert, cooperative, no distress, appears stated age  Head: Normocephalic, without obvious abnormality, atraumatic  Eyes: PERRL, conjunctiva/corneas clear, EOM's intact  Ears: Normal TM's and external ear canals, both ears  Nose: Nares normal, septum midline,mucosa normal, no drainage  Throat: Lips, mucosa, and tongue normal; teeth and gums normal  Neck: Supple, symmetrical, trachea midline, no adenopathy;  thyroid: not enlarged, symmetric, no tenderness/mass/nodules; no carotid bruit or JVD  Back: Symmetric, no curvature, ROM normal, no CVA tenderness  Lungs: Clear to auscultation bilaterally, respirations unlabored  Breasts: No breast masses, tenderness, asymmetry, or nipple discharge.  Heart: Regular rate and rhythm, S1 and S2 normal, no murmur, rub, or gallop, Abdomen: Soft, non-tender, bowel sounds active all four quadrants,  no masses, no organomegaly  Pelvic:Normally developed genitalia with no external lesions or eruptions. Vagina and cervix show no lesions, some mild inflammation is noted along with clumpy white discharge.  . No cystocele, No rectocele. Uterus appropriate size.  No adnexal mass or tenderness.  Extremities: Extremities normal, atraumatic, no cyanosis or edema  Skin: Skin color, texture, turgor normal, no rashes " or lesions  Lymph nodes: Cervical, supraclavicular, and axillary nodes normal  Neurologic: Normal     Lab:   Results for orders placed or performed in visit on 09/29/17   Wet Prep, Vaginal   Result Value Ref Range    Yeast Result No yeast seen No yeast seen    Trichomonas No Trichomonas seen No Trichomonas seen    Clue Cells, Wet Prep No Clue cells seen No Clue cells seen   Urinalysis Macroscopic   Result Value Ref Range    Color, UA Yellow Colorless, Yellow, Straw, Light Yellow    Clarity, UA Slightly Cloudy (!) Clear    Glucose,  mg/dL (!) Negative    Bilirubin, UA Negative Negative    Ketones, UA 15 mg/dL (!) Negative    Specific Gravity, UA 1.025 1.005 - 1.030    Blood, UA Trace (!) Negative    pH, UA 6.0 5.0 - 8.0    Protein, UA Negative Negative mg/dL    Urobilinogen, UA 0.2 E.U./dL 0.2 E.U./dL, 1.0 E.U./dL    Nitrite, UA Negative Negative    Leukocytes, UA Trace (!) Negative

## 2021-06-13 NOTE — PROGRESS NOTES
"Complains of a bad cough x 3 weeks. Now she is starting to have some pain in her right side.  Now her cough is dry.  Before it was productive.  No fever. No sobr. Cough is worse with laying down.  No leaking of fluid or bleeding.      O:  BP 94/58 (Patient Site: Right Arm, Patient Position: Sitting, Cuff Size: Adult Regular)  Pulse 88  Temp 98.1  F (36.7  C) (Oral)   Resp 16  Ht 5' 3\" (1.6 m)  Wt 133 lb (60.3 kg)  LMP 07/01/2017 (Exact Date)  BMI 23.56 kg/m2   Gen:  Nad, alert, coughing a lot.  Able to speak in complete sentences.    Lungs:  cta bilaterally, but inspiration is compromised by spastic coughing.  Post albuterol neb, her breathing is easy and full, no wheezes are noted.  She is able to draw a deep breath and says her chest discomfort has eased.    Rrr, no m/r/g  abd soft, fundus below umbilicus, non tender.    No edema in lower extremities.    Fht:  130    A/P:  1.  Pregnancy  Will refer for ob fetal survey today.    [facility]   Return in 4 weeks or prn    2.  Reactive airway disease  Use albuterol inhaler as needed.  Instructions given today on how to correctly use the inhaler.    Use honey, warm salt water gargles and plenty of liquids.    Return if sx are worsening.    "

## 2021-06-15 NOTE — PROGRESS NOTES
She plans to deliver her baby at [place].    Will need to find a care provider there.  It is closer to her house.    Reviewed US today.  It is a girl.  Posterior placenta.    She is quite constipated.  Advised prunes, increased water, increased fiber, will rx colace.    Needs 1 hour gct, hgb, uai, syphilis at next visit with her new provider.

## 2021-07-25 ENCOUNTER — HEALTH MAINTENANCE LETTER (OUTPATIENT)
Age: 31
End: 2021-07-25

## 2021-09-19 ENCOUNTER — HEALTH MAINTENANCE LETTER (OUTPATIENT)
Age: 31
End: 2021-09-19

## 2022-08-20 ENCOUNTER — HEALTH MAINTENANCE LETTER (OUTPATIENT)
Age: 32
End: 2022-08-20

## 2022-11-20 ENCOUNTER — HEALTH MAINTENANCE LETTER (OUTPATIENT)
Age: 32
End: 2022-11-20

## 2022-12-23 ENCOUNTER — HOSPITAL ENCOUNTER (EMERGENCY)
Facility: CLINIC | Age: 32
Discharge: HOME | End: 2022-12-23
Attending: PHYSICIAN ASSISTANT | Admitting: PHYSICIAN ASSISTANT
Payer: COMMERCIAL

## 2022-12-23 ENCOUNTER — APPOINTMENT (OUTPATIENT)
Dept: ULTRASOUND IMAGING | Facility: CLINIC | Age: 32
End: 2022-12-23
Attending: PHYSICIAN ASSISTANT
Payer: COMMERCIAL

## 2022-12-23 VITALS
HEART RATE: 81 BPM | OXYGEN SATURATION: 100 % | SYSTOLIC BLOOD PRESSURE: 101 MMHG | DIASTOLIC BLOOD PRESSURE: 62 MMHG | RESPIRATION RATE: 16 BRPM | TEMPERATURE: 96.8 F

## 2022-12-23 DIAGNOSIS — R10.2 PELVIC PAIN IN FEMALE: ICD-10-CM

## 2022-12-23 LAB
ALBUMIN UR-MCNC: NEGATIVE MG/DL
APPEARANCE UR: CLEAR
BILIRUB UR QL STRIP: NEGATIVE
COLOR UR AUTO: ABNORMAL
GLUCOSE UR STRIP-MCNC: NEGATIVE MG/DL
HCG UR QL: NEGATIVE
HGB UR QL STRIP: NEGATIVE
KETONES UR STRIP-MCNC: 10 MG/DL
LEUKOCYTE ESTERASE UR QL STRIP: NEGATIVE
MUCOUS THREADS #/AREA URNS LPF: PRESENT /LPF
NITRATE UR QL: NEGATIVE
PH UR STRIP: 5.5 [PH] (ref 5–7)
RBC URINE: 0 /HPF
SP GR UR STRIP: 1.01 (ref 1–1.03)
SQUAMOUS EPITHELIAL: 1 /HPF
UROBILINOGEN UR STRIP-MCNC: NORMAL MG/DL
WBC URINE: 1 /HPF

## 2022-12-23 PROCEDURE — 81001 URINALYSIS AUTO W/SCOPE: CPT | Performed by: EMERGENCY MEDICINE

## 2022-12-23 PROCEDURE — 81025 URINE PREGNANCY TEST: CPT | Performed by: PHYSICIAN ASSISTANT

## 2022-12-23 PROCEDURE — 81001 URINALYSIS AUTO W/SCOPE: CPT | Performed by: PHYSICIAN ASSISTANT

## 2022-12-23 PROCEDURE — 51798 US URINE CAPACITY MEASURE: CPT | Mod: XU

## 2022-12-23 PROCEDURE — 76830 TRANSVAGINAL US NON-OB: CPT

## 2022-12-23 PROCEDURE — 99285 EMERGENCY DEPT VISIT HI MDM: CPT | Mod: 25

## 2022-12-23 PROCEDURE — 81025 URINE PREGNANCY TEST: CPT | Performed by: EMERGENCY MEDICINE

## 2022-12-23 ASSESSMENT — ENCOUNTER SYMPTOMS
DIFFICULTY URINATING: 0
CONSTIPATION: 0
FREQUENCY: 1
RECTAL PAIN: 1
FEVER: 0

## 2022-12-23 ASSESSMENT — ACTIVITIES OF DAILY LIVING (ADL)
ADLS_ACUITY_SCORE: 33
ADLS_ACUITY_SCORE: 35

## 2022-12-23 NOTE — ED PROVIDER NOTES
"  History   Chief Complaint:  Vaginal Problem       The history is provided by the patient.      Rody Hassan is a 32 year old female who presents with rectal and vaginal pressure. She reports this sensation beginning about 8 days ago, describing a pressure and feeling that \"something is about to fall out\". The patient notes recent urinary frequency. She denies difficulty urinating, constipation, and fevers.  No numbness in the groin or rectal area.  No known trauma or injuries.  No discharge or bloody stools.    Review of Systems   Constitutional: Negative for fever.   Gastrointestinal: Positive for rectal pain. Negative for constipation.   Genitourinary: Positive for frequency and vaginal pain. Negative for difficulty urinating.   All other systems reviewed and are negative.    Allergies:  The patient has no known allergies.     Medications:  Zantac  Colace  Vistaril    Past Medical History:     Esophageal reflux  H pylori infection  Latent tuberculosis   Vitamin D deficiency   Anxiety  Iron deficiency anemia  Primary ovarian insufficiency  Diabetes mellitus    Family History:    Rheumatoid arthritis     Social History:  The patient presents to the ED alone  Arrived by private vehicle   PCP: [facility]     Physical Exam     Patient Vitals for the past 24 hrs:   BP Temp Temp src Pulse Resp SpO2   12/23/22 0828 113/71 96.8  F (36  C) Temporal 92 18 98 %       Physical Exam  General: Awake, alert, non-toxic.  Head:  Scalp is NC/AT  Eyes:  Conjunctiva normal, PERRL  ENT:  The external nose and ears are normal.   Neck:  Normal range of motion without rigidity.  CV:  Regular rate and rhythm    No pathologic murmur, rubs, or gallops.  Resp:  Breath sounds are clear bilaterally    Non-labored, no retractions or accessory muscle use  Abdomen: Abdomen is soft, no distension, no tenderness, no masses.   Pelvic: Normal external genitalia.  No discharge in vault.  No visualized bleeding.  Cervix closed.  No " CMT or adnexal tenderness.  Rectal area appears normal on exam no visualized external hemorrhoids or fissures.  No fluctuance tenderness or lesions around the rectal area.  Declines MORRIS.  (Performed in the presence of female chapmiller Adams RN) post void bladder scan with 4 cc.  MS:  No lower extremity edema or asymmetric calf swelling.  Skin:  Warm and dry, No rash or lesions noted.  Neuro: Alert and oriented.  GCS 15 No gross motor deficits.  No facial asymmetry.  Psych:  Awake. Alert. Normal affect. Appropriate interactions.      Emergency Department Course     Imaging:  US Pelvic Complete with Transvaginal   Final Result   IMPRESSION: No acute process or prolapse demonstrated.      BLACK LANE MD            SYSTEM ID:  [id number]      Report per radiology    Laboratory:  Labs Ordered and Resulted from Time of ED Arrival to Time of ED Departure   ROUTINE UA WITH MICROSCOPIC REFLEX TO CULTURE - Abnormal       Result Value    Color Urine Straw      Appearance Urine Clear      Glucose Urine Negative      Bilirubin Urine Negative      Ketones Urine 10 (*)     Specific Gravity Urine 1.006      Blood Urine Negative      pH Urine 5.5      Protein Albumin Urine Negative      Urobilinogen Urine Normal      Nitrite Urine Negative      Leukocyte Esterase Urine Negative      Mucus Urine Present (*)     RBC Urine 0      WBC Urine 1      Squamous Epithelials Urine 1     HCG QUALITATIVE URINE - Normal    hCG Urine Qualitative Negative          Emergency Department Course:     Reviewed:  I reviewed nursing notes, vitals, past medical history and Care Everywhere    Assessments:  1128 I obtained history and examined the patient as noted above.   1230 I rechecked the patient and explained findings. Pelvic exam chaperoned by FAM Ravi.  1249 I rechecked and updated the patient. At this point I feel that the patient is safe for discharge, and the patient agrees.     Disposition:  The patient was discharged to home.      Impression & Plan     Medical Decision Makin-year-old female presents with a sensation of fullness in the pelvic and rectal area.  Broad differential considered.  Ultrasound is unremarkable.  UA is clear.  She is not pregnant.  There is no examination or imaging evidence of pelvic organ prolapse.  No urinary retention.  Nothing to suggest PID.  Abdominal exam is benign with no tenderness nothing to suggest intra-abdominal catastrophe.  Exam of the rectal area shows no evidence of perianal abscess no fever or chills no signs of hemorrhoids.  Question whether she might have some internal hemorrhoids but declines MORRIS.  I will have her follow-up with primary care provider who is also an OB/GYN for further evaluation.  She will return if increasing pain fever bleeding chills difficulty urinating or having bowel movements or other concerns.      Diagnosis:    ICD-10-CM    1. Pelvic pain in female  R10.2         Scribe Disclosure:  I, Arti Funk, am serving as a scribe at 11:25 AM on 2022 to document services personally performed by Julian Sanchez PA-C based on my observations and the provider's statements to me.        Julian Sanchez PA-C  [phone]

## 2022-12-23 NOTE — ED TRIAGE NOTES
"Pt arrives with c/o rectal and vaginal pressure sensation for 8 days. Pt reports last BM was yesterday and was \"a lot\" that was normal looking.     Triage Assessment     Row Name 12/23/22 6104       Triage Assessment (Adult)    Airway WDL WDL       Respiratory WDL    Respiratory WDL WDL       Skin Circulation/Temperature WDL    Skin Circulation/Temperature WDL WDL       Cardiac WDL    Cardiac WDL WDL       Peripheral/Neurovascular WDL    Peripheral Neurovascular WDL WDL       Cognitive/Neuro/Behavioral WDL    Cognitive/Neuro/Behavioral WDL WDL              "

## 2023-04-15 ENCOUNTER — HEALTH MAINTENANCE LETTER (OUTPATIENT)
Age: 33
End: 2023-04-15

## 2023-09-21 ENCOUNTER — LAB REQUISITION (OUTPATIENT)
Dept: LAB | Facility: CLINIC | Age: 33
End: 2023-09-21
Payer: MEDICAID

## 2023-09-21 DIAGNOSIS — N95.1 MENOPAUSAL AND FEMALE CLIMACTERIC STATES: ICD-10-CM

## 2023-09-21 LAB
ESTRADIOL SERPL-MCNC: 81 PG/ML
FSH SERPL IRP2-ACNC: 31.3 MIU/ML
MIS SERPL-MCNC: 0.03 NG/ML (ref 0.15–7.5)

## 2023-09-21 PROCEDURE — 82670 ASSAY OF TOTAL ESTRADIOL: CPT | Mod: ORL | Performed by: OBSTETRICS & GYNECOLOGY

## 2023-09-21 PROCEDURE — 83001 ASSAY OF GONADOTROPIN (FSH): CPT | Mod: ORL | Performed by: OBSTETRICS & GYNECOLOGY

## 2023-09-21 PROCEDURE — 83520 IMMUNOASSAY QUANT NOS NONAB: CPT | Mod: ORL | Performed by: OBSTETRICS & GYNECOLOGY

## 2024-06-22 ENCOUNTER — HEALTH MAINTENANCE LETTER (OUTPATIENT)
Age: 34
End: 2024-06-22

## 2025-07-12 ENCOUNTER — HEALTH MAINTENANCE LETTER (OUTPATIENT)
Age: 35
End: 2025-07-12

## 2025-07-17 NOTE — PROGRESS NOTES
Chief Complaint   Patient presents with     Cough     x1 week and cold symptoms - chest pain with coughing and deep breaths - 16 WEEKS PREGNANT      Sore Throat       HPI    Patient is here for a week of productive cough that has become dry now, with nasal discharge and moderate sore throat. Cough causes chest tenderness. No fever, shortness of breath. She is 16 wks pregnant.     ROS: Pertinent ROS noted in HPI.     No Known Allergies    Patient Active Problem List   Diagnosis     Alopecia     Vitamin D Deficiency     Heartburn     Helicobacter pylori (H. pylori)       Family History   Problem Relation Age of Onset     No Medical Problems Mother      No Medical Problems Father      No Medical Problems Sister      No Medical Problems Sister      No Medical Problems Sister      No Medical Problems Sister      Rheum arthritis Sister      Diabetes Maternal Grandmother      Social History     Social History     Marital status:      Spouse name: Ricky Long     Number of children: 1     Years of education: N/A     Occupational History     Not on file.     Social History Main Topics     Smoking status: Never Smoker     Smokeless tobacco: Never Used     Alcohol use No     Drug use: No     Sexual activity: Yes     Other Topics Concern     Not on file     Social History Narrative           Objective:    Vitals:    10/31/17 1312   BP: 108/66   Pulse: (!) 115   Resp: 20   Temp: 98  F (36.7  C)   SpO2: 100%       Gen:NAD  Oropharynx: normal throat, oral mucosa  Ears: normal TMs and anals  Nose: no discharge  Neck: NAD  CV: RRR, no M, R, G  Pulm: CTAB, normal effort      Recent Results (from the past 24 hour(s))   Rapid Strep A Screen-Throat   Result Value Ref Range    Rapid Strep A Antigen No Group A Strep detected, presumptive negative No Group A Strep detected, presumptive negative         URI (upper respiratory infection) - supportive cares as directed (a sheet with pregnancy safe OTC cold mes provided to  patient)    Sore throat  -     Rapid Strep A Screen-Throat  -     Group A Strep, RNA Direct Detection, Throat         [FreeTextEntry1] : 07/15/2025 --   23--74-year-old female with pelvic organ prolapse, UUI. Size # 4 pessary placed at her last visit. She removed the pessary at home- she does not like using the pessary.   23--74 F with pelvic organ prolapse, UUI. Here today for pessary fitting. She previously had a size #5 pessary in place. Removed due to discomfort. She would like to try a smaller size. She reported vaginal irritation at her last visit. Genital culture showed Staphylococcus aureus. Today she has similar symptoms. UCx positive for Enterococcus Faecalis on 25. She was treated with a course of Ciprofloxacin. Symptoms have improved. She would like a test of cure.  2025 -- 74 F with pelvic organ prolapse, UUI. Patient fitted with size 5 pessary on her last visit . The patient has had the pessary removed- "feels too big". Reports vaginal irritation with some burning sensation. No discharge. Denies h/o yeast infection. Reports herpes flare >couple weeks ago, states she has some relief with a+d ointment. Patient states she has not had any recent gynecological appointment.  CT pelvis without contrast with Dr. Anguiano-- Moderate pelvic prolapse. note- pt states the CT was ordered as per her request.   24--74-year-old female with pelvic organ prolapse, UUI. Here today for pessary fitting.  22--74F presents for initial evaluation of prolapse and incontinence Referred by Dr. Wise  PMH significant for: none PSH significant for: none Significant meds: none  Patient reports bladder is falling down. Noticed it a few months ago. Feels a bulge in the vagina, splinting. Reports having urgency when bladder is full, otherwise she sometimes has UUI. No JUANIS. No gross hematuria. No hx of kidney stones. No hx of recurrent UTIs. Currently taking Cipro for UTI that was found on PE by PCP.  Daytime Frequency: 3 Nighttime Frequency: 1 Pads per day: 1 Straining to void: no Subjective Incomplete Emptying: no   Heaviness: yes Hysterectomy: no Pain with Orwell: no  Daily Fluid Total: 2 glasses of water Daily Caffeine Total: 1 cup  Fecal Incontinence/Constipation: 1 BM/daily